# Patient Record
Sex: MALE
[De-identification: names, ages, dates, MRNs, and addresses within clinical notes are randomized per-mention and may not be internally consistent; named-entity substitution may affect disease eponyms.]

---

## 2018-11-28 ENCOUNTER — RESULT REVIEW (OUTPATIENT)
Age: 53
End: 2018-11-28

## 2022-04-28 ENCOUNTER — APPOINTMENT (OUTPATIENT)
Dept: GASTROENTEROLOGY | Facility: CLINIC | Age: 57
End: 2022-04-28
Payer: COMMERCIAL

## 2022-04-28 VITALS
SYSTOLIC BLOOD PRESSURE: 182 MMHG | BODY MASS INDEX: 27.47 KG/M2 | DIASTOLIC BLOOD PRESSURE: 102 MMHG | HEART RATE: 118 BPM | RESPIRATION RATE: 18 BRPM | HEIGHT: 67 IN | WEIGHT: 175 LBS

## 2022-04-28 DIAGNOSIS — Z86.010 PERSONAL HISTORY OF COLONIC POLYPS: ICD-10-CM

## 2022-04-28 PROBLEM — Z00.00 ENCOUNTER FOR PREVENTIVE HEALTH EXAMINATION: Status: ACTIVE | Noted: 2022-04-28

## 2022-04-28 PROCEDURE — 99202 OFFICE O/P NEW SF 15 MIN: CPT

## 2022-04-28 RX ORDER — SODIUM SULFATE, POTASSIUM SULFATE, MAGNESIUM SULFATE 17.5; 3.13; 1.6 G/ML; G/ML; G/ML
17.5-3.13-1.6 SOLUTION, CONCENTRATE ORAL
Qty: 1 | Refills: 0 | Status: ACTIVE | COMMUNITY
Start: 2022-04-28 | End: 1900-01-01

## 2022-04-28 NOTE — HISTORY OF PRESENT ILLNESS
[FreeTextEntry1] : The patient had a colonoscopy in November 2018 at which time a villoglandular polyp was removed and is here for followup colonoscopy. He has been in good health. He has hypertension under good control. No reflux no change in bowel habits

## 2022-07-20 ENCOUNTER — APPOINTMENT (OUTPATIENT)
Dept: GASTROENTEROLOGY | Facility: AMBULATORY MEDICAL SERVICES | Age: 57
End: 2022-07-20

## 2022-07-20 ENCOUNTER — RESULT REVIEW (OUTPATIENT)
Age: 57
End: 2022-07-20

## 2022-07-20 PROCEDURE — 45380 COLONOSCOPY AND BIOPSY: CPT

## 2024-07-05 ENCOUNTER — OFFICE VISIT (OUTPATIENT)
Dept: FAMILY MEDICINE | Facility: OTHER | Age: 59
End: 2024-07-05
Attending: NURSE PRACTITIONER
Payer: COMMERCIAL

## 2024-07-05 VITALS
WEIGHT: 181.5 LBS | RESPIRATION RATE: 20 BRPM | SYSTOLIC BLOOD PRESSURE: 180 MMHG | HEIGHT: 66 IN | BODY MASS INDEX: 29.17 KG/M2 | TEMPERATURE: 96.9 F | HEART RATE: 80 BPM | OXYGEN SATURATION: 98 % | DIASTOLIC BLOOD PRESSURE: 78 MMHG

## 2024-07-05 DIAGNOSIS — H57.89 REDNESS OF RIGHT EYE: ICD-10-CM

## 2024-07-05 DIAGNOSIS — H11.31 SUBCONJUNCTIVAL HEMORRHAGE OF RIGHT EYE: Primary | ICD-10-CM

## 2024-07-05 PROCEDURE — 99203 OFFICE O/P NEW LOW 30 MIN: CPT

## 2024-07-05 ASSESSMENT — PAIN SCALES - GENERAL: PAINLEVEL: NO PAIN (0)

## 2024-07-05 NOTE — PROGRESS NOTES
ASSESSMENT/PLAN:    I have reviewed the nursing notes.  I have reviewed the findings, diagnosis, plan and need for follow up with the patient.    1. Redness of right eye  2. Subconjunctival hemorrhage of right eye    - Please read the attached information on subconjunctival hemorrhage for at home care treatment.    - May use over-the-counter Tylenol or ibuprofen as needed for pain, inflammation or fever.    - Discussed warning signs/symptoms indicative of need to f/u    - Follow up if symptoms persist or worsen or concerns    - I explained my diagnostic considerations and recommendations to the patient, who voiced understanding and agreement with the treatment plan. All questions were answered. We discussed potential side effects of any prescribed or recommended therapies, as well as expectations for response to treatments.    THEODORE Snyder CNP  7/5/2024  12:50 PM    HPI:    Ed Willoughby is a 58 year old male who presents to [facility] today for concerns of right a red eye.  Patient states that yesterday he noticed that his eye was reddened.  Denies any injury or trauma, states that he may have possibly itched his eye while sleeping.  Denies any blurred vision, double vision or any vision changes.  Denies any pain or irritation.  Patient states that this has happened to him before but family was concerned and wanted him to have it checked out.      No past medical history on file.  No past surgical history on file.  Social History     Tobacco Use    Smoking status: Never    Smokeless tobacco: Never   Substance Use Topics    Alcohol use: Not Currently     Comment: 18/week     No current outpatient medications on file.     No Known Allergies  Past medical history, past surgical history, current medications and allergies reviewed and accurate to the best of my knowledge.      ROS:  Refer to HPI    BP (!) 180/78 (BP Location: Right arm, Patient Position: Sitting, Cuff Size: Adult Regular)   Pulse (!) 133   Temp  "96.9  F (36.1  C) (Temporal)   Resp 20   Ht 1.683 m (5' 6.25\")   Wt 82.3 kg (181 lb 8 oz)   SpO2 98%   BMI 29.07 kg/m      EXAM:  General Appearance: Well appearing 58 year old male, appropriate appearance for age. No acute distress   Eyes: conjunctivae normal without erythema or irritation, corneas clear, no drainage or crusting, no eyelid swelling, pupils equal.  Blood noted under sclera of right eye.   Respiratory: normal chest wall and respirations.  Normal effort.  Clear to auscultation bilaterally, no wheezing, crackles or rhonchi.  No increased work of breathing.  No cough appreciated.  Cardiac: RRR with no murmurs  Musculoskeletal:  Equal movement of bilateral upper extremities.  Equal movement of bilateral lower extremities.  Normal gait.    Neuro: Alert and oriented to person, place, and time.    Psychological: normal affect, alert, oriented, and pleasant.   "

## 2024-07-05 NOTE — NURSING NOTE
"Patient presents to the [facility] today for irritation in his right eye. Patient states this has happened to him before but, he does not experience any pain or discomfort with the irritation.    Chief Complaint   Patient presents with    Eye Problem     Rt           Initial BP (!) 180/78 (BP Location: Right arm, Patient Position: Sitting, Cuff Size: Adult Regular)   Pulse (!) 133   Temp 96.9  F (36.1  C) (Temporal)   Resp 20   Ht 1.683 m (5' 6.25\")   Wt 82.3 kg (181 lb 8 oz)   SpO2 98%   BMI 29.07 kg/m   Estimated body mass index is 29.07 kg/m  as calculated from the following:    Height as of this encounter: 1.683 m (5' 6.25\").    Weight as of this encounter: 82.3 kg (181 lb 8 oz).     FOOD SECURITY SCREENING QUESTIONS:    The next two questions are to help us understand your food security.  If you are feeling you need any assistance in this area, we have resources available to support you today.    Hunger Vital Signs:  Within the past 12 months we worried whether our food would run out before we got money to buy more. Never  Within the past 12 months the food we bought just didn't last and we didn't have money to get more. Never      Daylin Mccall     "